# Patient Record
Sex: FEMALE | Race: WHITE
[De-identification: names, ages, dates, MRNs, and addresses within clinical notes are randomized per-mention and may not be internally consistent; named-entity substitution may affect disease eponyms.]

---

## 2016-10-14 VITALS
SYSTOLIC BLOOD PRESSURE: 144 MMHG | DIASTOLIC BLOOD PRESSURE: 70 MMHG | DIASTOLIC BLOOD PRESSURE: 70 MMHG | SYSTOLIC BLOOD PRESSURE: 144 MMHG

## 2020-07-29 ENCOUNTER — HOSPITAL ENCOUNTER (OUTPATIENT)
Dept: HOSPITAL 61 - KCIC MRI | Age: 55
Discharge: HOME | End: 2020-07-29
Attending: PODIATRIST
Payer: COMMERCIAL

## 2020-07-29 DIAGNOSIS — M76.822: Primary | ICD-10-CM

## 2020-07-29 DIAGNOSIS — R60.0: ICD-10-CM

## 2020-07-29 PROCEDURE — 73718 MRI LOWER EXTREMITY W/O DYE: CPT

## 2020-07-29 NOTE — [facility]
Study: MRI left ankle without contrast

 

INDICATION: Posterior tibial tendinitis.

 

COMPARISON: None.

 

TECHNIQUE: Multiplanar MR imaging of the left ankle performed without the 

use of intravenous or intra-articular contrast.

 

FINDINGS:

 

Several sequences are degraded by low signal-to-noise ratio, most notably 

the coronal and axial T2 sequences.

 

Bones/cartilage: There may be chondral thinning at the anterior and 

anterolateral ankle joint space. No full-thickness chondral defect is 

identified. Subchondral marrow edema at the posterolateral aspect of the 

talus adjacent to the posterior subtalar joint, image 13 series 3. Os 

trigonum. Very mild scattered degenerative changes at the midfoot. Small 

plantar calcaneal spur. No acute fracture.

 

Ligaments: Grossly intact syndesmotic ligaments. Intact ATFL, CFL and 

PTFL. Signal changes of the deep deltoid ligament typical of ligamentous 

degeneration or prior injury. Grossly intact superficial deltoid ligament 

structures. The spring ligament appears intact. Unremarkable Lisfranc 

ligament.

 

Musculotendinous: Intact peroneal tendons. Small amount of peroneal tendon

sheath fluid without overt tenosynovitis. Tendinosis and a suspected 

low-grade partial tear of the insertional aspect of the PTT noting 

somewhat limited assessment due to the degree of noise. Intact FDL and 

FHL. Intact extensors. Normal thickness of the Achilles.

 

Sinus Tarsi: Partially replaced expected fatty signal with edema, image 13

series 5. 

 

Tarsal tunnel: Within normal limits.

 

Plantar fascia: Thickened plantar fascia central band. No fascial tear or 

surrounding soft tissue edema.

 

Miscellaneous: Fluid surrounds the os trigonum communicating with the 

posterior subtalar joint. No ankle joint effusion.

 

IMPRESSION:

1.  Degraded study due to a low signal-to-noise ratio particularly 

involving the coronal and axial T2-weighted sequences.

2.  Tendinotic thickening of the insertional aspect of the PTT with a 

suspected superimposed low-grade partial tear. The additional tendons at 

the ankle are unremarkable.

3.  No acute ligamentous injury or chronic full-thickness disruption. 

Grossly intact spring ligament.

4.  Os trigonum surrounded by fluid originating from the posterior 

subtalar joint. There is some edema of the adjacent talus which could 

either be related to mild posterior subtalar joint arthrosis or secondary 

to posterior impingement.

5.  Mildly edematous sinus tarsi. Correlate clinically for symptoms of 

sinus tarsi syndrome.

6.  Thickened plantar fascia central band but without findings of active 

plantar fasciitis.

 

Electronically signed by: SUMAN CHURCHILL MD (7/29/2020 3:31 PM) [id number]

## 2021-06-23 ENCOUNTER — HOSPITAL ENCOUNTER (OUTPATIENT)
Dept: HOSPITAL 61 - MAMMO | Age: 56
End: 2021-06-23
Payer: COMMERCIAL

## 2021-06-23 DIAGNOSIS — N60.02: ICD-10-CM

## 2021-06-23 DIAGNOSIS — N60.01: ICD-10-CM

## 2021-06-23 DIAGNOSIS — N63.13: Primary | ICD-10-CM

## 2021-06-23 PROCEDURE — 76641 ULTRASOUND BREAST COMPLETE: CPT

## 2021-06-23 PROCEDURE — 77066 DX MAMMO INCL CAD BI: CPT

## 2021-06-23 NOTE — RAD
DATE: 6/23/2021



EXAM: MAMMO ISSA DIAG BILAT, BREAST BILATERAL



HISTORY: Palpable lump in the left breast at 12:00



COMPARISON: None, prior there are over 10 years old and unavailable. New

baseline exam.



This study was interpreted with the benefit of Computerized Aided Detection

(CAD).





Breast Density: HETERO The breast parenchyma is heterogenously dense, which

could reduce sensitivity of mammography. Breast parenchyma level C.





FINDINGS:



MAMMOGRAM:



There is a palpable marker at 12:00, 6 cm approximately posterior to the

nipple in the left breast. Deep to this, there is a circumscribed mass/masses

measuring up to 1.8 cm in diameter. There are multiple other similar-appearing

circumscribed masses bilaterally. For example, there is a group of

circumscribed masses in the right breast at 12:00, 3.5 cm the nipple,

measuring up to 1 cm. There is a mass in the right breast at 7:00

approximately 2 cm posterior to the nipple measuring 6 mm. No suspicious

calcifications or architectural distortion.



ULTRASOUND:



In the left breast in area of palpable concern at 11:00, 4 cm from the nipple,

there is a 1.6 x 1.7 x 1.9 cm ovoid anechoic circumscribed simple cyst with

through transmission. There is immediately adjacent similar appearing simple

cyst measuring 1.2 x 1.0 x 0.8 cm.



In the right breast at 12:00, in the retroareolar region there are a cluster

of similar appearing anechoic simple cysts, largest measuring 1.1 x 0.7 x 0.8

cm.



In the right breast at 7:00, 3 cm the nipple, there is a hypoechoic ovoid mass

measuring 1.0 x 0.6 x 0.7 cm, likely clustered microcysts or fibrocystic

changes. This has no internal vascularity.



No lymphadenopathy in either axilla.





IMPRESSION: 

1. There are 2 simple cysts in upper left breast in the area of palpable

concern. Multiple other simple cysts bilaterally correlating with

circumscribed masses on mammogram. These do not require follow-up.



2. Hypoechoic ovoid mass in the right breast at 7:00, 3 cm from the nipple on

ultrasound is probably  benign clustered microcysts or fibrocystic changes.

Recommend 6 month follow-up ultrasound to ensure stability.







BI-RADS CATEGORY: 3 PROBABLY BENIGN FINDING(S)-SHORT INTERVAL FOLLOW-UP

SUGGESTED



RECOMMENDED FOLLOW-UP: 6M 6 MONTH FOLLOW-UP



PQRS compliance statement: Patient information was entered into a reminder

system with a target due date for the next mammogram.



Mammography is a sensitive method for finding small breast cancers, but it

does not detect them all and is not a substitute for careful clinical

examination.  A negative mammogram does not negate a clinically suspicious

finding and should not result in delay in biopsying a clinically suspicious

abnormality.



"Our facility is accredited by the [employer] Mammography

Program."